# Patient Record
(demographics unavailable — no encounter records)

---

## 2025-06-27 NOTE — HISTORY OF PRESENT ILLNESS
[de-identified] : 53 F presents with 1 month of left dorsal forefoot pain  She denies any specific injury or inciting even though she was sitting with her legs crossed on her couch and it started then She also has a history of left ankle sprain 2 years ago Pain can be worse with walking  She has taken Tylenol and more recently Advil for pain. Advil has provided some relief

## 2025-06-27 NOTE — PHYSICAL EXAM
[de-identified] : Constitutional: Well-nourished, well-developed, No acute distress, obese Respiratory:  Good respiratory effort, no SOB Lymphatic: No regional lymphadenopathy, no lymphedema Psychiatric: Pleasant and normal affect, alert and oriented x3 Skin: Clean dry and intact left foot Musculoskeletal: normal except where as noted in regional exam     Left Foot: APPEARANCE: no swelling, no marked deformities or malalignment POSITIVE TENDERNESS: 2nd/3rd Tarsometatarsal joints, dorsal foot NONTENDER: 5th metatarsal base, cuboid, 1st MTP, plantar surfaces, medial heel, mid heel. ROM: normal throughout foot, ankle, and digits. RESISTIVE TESTING: painless flex/ext, abd/add of all digits.  [de-identified] : The following radiographs were ordered and read by me during this patient's visit. I reviewed each radiograph in detail with the patient and discussed the findings as highlighted below.   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  There is a calcific fragment seen off the dorsal talus, this is either a chronic avulsion fracture, or possibly a chronic sesamoid, but this is not correlate with the patient's site of pain, do not feel this is an acute fracture.  No fracture, or dislocation seen at this time. There is no malalignment. No other obvious osseous abnormality. Otherwise unremarkable.

## 2025-06-27 NOTE — DISCUSSION/SUMMARY
[de-identified] : Discussed findings of today's exam and possible causes of patient's pain.  Educated patient on their most probable diagnosis of chronic intermittent left foot pain with recent atraumatic exacerbation 1 month ago due to arthritis at the tarsometatarsal joints.  Reviewed possible courses of treatment, and we collaboratively decided best course of treatment at this time will include conservative management.  Patient has an ossific fragment seen of the dorsal navicular at the talonavicular joint, but this does not clinically correlate to her site of maximal pain, do not feel this is an acute fracture.  This is either a chronic avulsion fracture, or a chronic sesamoid.  Patient is currently having midfoot pain due to flareup of underlying osteoarthritis.  Patient started on a course of oral NSAIDs, prescription given for Mobic (We discussed all possible side effects of this medication).  Patient will be started on a course of physical therapy to restore normal range of motion and strength as tolerated.  Patient is recommended to obtain appropriate walking shoes for any distance walking, recommend the brand of [employer] or [employer].  If patient has persisting pain may consider cortisone injection 1-2 weeks before her upcoming [place] vacation at the end of July.  Follow up as needed.  Patient appreciates and agrees with current plan.  This note was generated using dragon medical dictation software.  A reasonable effort has been made for proofreading its contents, but typos may still remain.  If there are any questions or points of clarification needed please notify my office.